# Patient Record
Sex: FEMALE | Race: WHITE | NOT HISPANIC OR LATINO | URBAN - METROPOLITAN AREA
[De-identification: names, ages, dates, MRNs, and addresses within clinical notes are randomized per-mention and may not be internally consistent; named-entity substitution may affect disease eponyms.]

---

## 2018-11-17 ENCOUNTER — EMERGENCY (EMERGENCY)
Facility: HOSPITAL | Age: 58
LOS: 1 days | Discharge: HOME | End: 2018-11-17
Admitting: EMERGENCY MEDICINE
Payer: COMMERCIAL

## 2018-11-17 VITALS
HEART RATE: 78 BPM | DIASTOLIC BLOOD PRESSURE: 84 MMHG | SYSTOLIC BLOOD PRESSURE: 118 MMHG | RESPIRATION RATE: 18 BRPM | OXYGEN SATURATION: 99 % | TEMPERATURE: 98 F

## 2018-11-17 VITALS
OXYGEN SATURATION: 100 % | SYSTOLIC BLOOD PRESSURE: 109 MMHG | TEMPERATURE: 98 F | HEART RATE: 60 BPM | DIASTOLIC BLOOD PRESSURE: 73 MMHG | RESPIRATION RATE: 16 BRPM

## 2018-11-17 DIAGNOSIS — Z91.018 ALLERGY TO OTHER FOODS: ICD-10-CM
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
## 2018-11-17 DIAGNOSIS — S60.455A SUPERFICIAL FOREIGN BODY OF LEFT RING FINGER, INITIAL ENCOUNTER: ICD-10-CM
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
## 2018-11-17 PROCEDURE — 99282 EMERGENCY DEPT VISIT SF MDM: CPT

## 2018-11-17 NOTE — ED PROVIDER NOTE - MEDICAL DECISION MAKING DETAILS
Ring removal of the 4th left digit. Will remove. Ring removal of the 4th left digit. platnium and lynda band  multiple attempts to remove ring in ED, [employer] called and was able to remove.  FROM and no lacerations or abrasions.  pt tolerated well

## 2018-11-17 NOTE — ED PROVIDER NOTE - OBJECTIVE STATEMENT
57 y o female with no significant PMHX presents to the ED for wedding ring removal from her Left 4th digit. Pt has been attempting to remove it for the past couple of years unsuccessfully. Denies any other complaints.

## 2021-03-29 ENCOUNTER — APPOINTMENT (OUTPATIENT)
Age: 61
End: 2021-03-29
Payer: COMMERCIAL

## 2021-03-29 PROCEDURE — 0001A: CPT

## 2021-04-19 ENCOUNTER — APPOINTMENT (OUTPATIENT)
Age: 61
End: 2021-04-19

## 2021-04-19 PROBLEM — Z00.00 ENCOUNTER FOR PREVENTIVE HEALTH EXAMINATION: Status: ACTIVE | Noted: 2021-04-19
